# Patient Record
Sex: FEMALE | Race: WHITE | NOT HISPANIC OR LATINO | URBAN - METROPOLITAN AREA
[De-identification: names, ages, dates, MRNs, and addresses within clinical notes are randomized per-mention and may not be internally consistent; named-entity substitution may affect disease eponyms.]

---

## 2019-06-12 ENCOUNTER — EMERGENCY (EMERGENCY)
Facility: HOSPITAL | Age: 34
LOS: 1 days | Discharge: HOME | End: 2019-06-12
Attending: EMERGENCY MEDICINE
Payer: COMMERCIAL

## 2019-06-12 VITALS — HEIGHT: 66 IN | WEIGHT: 199.96 LBS

## 2019-06-12 LAB
ALBUMIN SERPL ELPH-MCNC: 4.8 G/DL — SIGNIFICANT CHANGE UP (ref 3.3–5.2)
ALP SERPL-CCNC: 72 U/L — SIGNIFICANT CHANGE UP (ref 40–120)
ALT FLD-CCNC: 26 U/L — SIGNIFICANT CHANGE UP
ANION GAP SERPL CALC-SCNC: 16 MMOL/L — SIGNIFICANT CHANGE UP (ref 5–17)
APAP SERPL-MCNC: 20.4 UG/ML — SIGNIFICANT CHANGE UP (ref 10–26)
AST SERPL-CCNC: 53 U/L — HIGH
BASOPHILS # BLD AUTO: 0.1 K/UL — SIGNIFICANT CHANGE UP (ref 0–0.2)
BASOPHILS NFR BLD AUTO: 1 % — SIGNIFICANT CHANGE UP (ref 0–2)
BILIRUB SERPL-MCNC: 0.4 MG/DL — SIGNIFICANT CHANGE UP (ref 0.4–2)
BUN SERPL-MCNC: 12 MG/DL — SIGNIFICANT CHANGE UP (ref 8–20)
CALCIUM SERPL-MCNC: 10.2 MG/DL — SIGNIFICANT CHANGE UP (ref 8.6–10.2)
CHLORIDE SERPL-SCNC: 103 MMOL/L — SIGNIFICANT CHANGE UP (ref 98–107)
CO2 SERPL-SCNC: 20 MMOL/L — LOW (ref 22–29)
CREAT SERPL-MCNC: 1 MG/DL — SIGNIFICANT CHANGE UP (ref 0.5–1.3)
EOSINOPHIL # BLD AUTO: 0.8 K/UL — HIGH (ref 0–0.5)
EOSINOPHIL NFR BLD AUTO: 6 % — HIGH (ref 0–5)
ETHANOL SERPL-MCNC: <10 MG/DL — SIGNIFICANT CHANGE UP
GLUCOSE SERPL-MCNC: 95 MG/DL — SIGNIFICANT CHANGE UP (ref 70–115)
HCG SERPL-ACNC: <4 MIU/ML — SIGNIFICANT CHANGE UP
HCT VFR BLD CALC: 35.7 % — LOW (ref 37–47)
HGB BLD-MCNC: 12 G/DL — SIGNIFICANT CHANGE UP (ref 12–16)
LYMPHOCYTES # BLD AUTO: 26 % — SIGNIFICANT CHANGE UP (ref 20–55)
LYMPHOCYTES # BLD AUTO: 3.9 K/UL — SIGNIFICANT CHANGE UP (ref 1–4.8)
MCHC RBC-ENTMCNC: 29.5 PG — SIGNIFICANT CHANGE UP (ref 27–31)
MCHC RBC-ENTMCNC: 33.6 G/DL — SIGNIFICANT CHANGE UP (ref 32–36)
MCV RBC AUTO: 87.7 FL — SIGNIFICANT CHANGE UP (ref 81–99)
MONOCYTES # BLD AUTO: 1.3 K/UL — HIGH (ref 0–0.8)
MONOCYTES NFR BLD AUTO: 11 % — HIGH (ref 3–10)
NEUTROPHILS # BLD AUTO: 8.5 K/UL — HIGH (ref 1.8–8)
NEUTROPHILS NFR BLD AUTO: 56 % — SIGNIFICANT CHANGE UP (ref 37–73)
PLATELET # BLD AUTO: 401 K/UL — HIGH (ref 150–400)
POTASSIUM SERPL-MCNC: 4.9 MMOL/L — SIGNIFICANT CHANGE UP (ref 3.5–5.3)
POTASSIUM SERPL-SCNC: 4.9 MMOL/L — SIGNIFICANT CHANGE UP (ref 3.5–5.3)
PROT SERPL-MCNC: 8.6 G/DL — SIGNIFICANT CHANGE UP (ref 6.6–8.7)
RBC # BLD: 4.07 M/UL — LOW (ref 4.4–5.2)
RBC # FLD: 14.3 % — SIGNIFICANT CHANGE UP (ref 11–15.6)
SALICYLATES SERPL-MCNC: <0.6 MG/DL — LOW (ref 10–20)
SODIUM SERPL-SCNC: 139 MMOL/L — SIGNIFICANT CHANGE UP (ref 135–145)
WBC # BLD: 14.6 K/UL — HIGH (ref 4.8–10.8)
WBC # FLD AUTO: 14.6 K/UL — HIGH (ref 4.8–10.8)

## 2019-06-12 PROCEDURE — 99284 EMERGENCY DEPT VISIT MOD MDM: CPT

## 2019-06-12 PROCEDURE — 93010 ELECTROCARDIOGRAM REPORT: CPT

## 2019-06-12 RX ORDER — SODIUM CHLORIDE 9 MG/ML
1000 INJECTION INTRAMUSCULAR; INTRAVENOUS; SUBCUTANEOUS ONCE
Refills: 0 | Status: COMPLETED | OUTPATIENT
Start: 2019-06-12 | End: 2019-06-12

## 2019-06-12 RX ORDER — HALOPERIDOL DECANOATE 100 MG/ML
5 INJECTION INTRAMUSCULAR ONCE
Refills: 0 | Status: COMPLETED | OUTPATIENT
Start: 2019-06-12 | End: 2019-06-12

## 2019-06-12 RX ADMIN — Medication 2 MILLIGRAM(S): at 14:35

## 2019-06-12 RX ADMIN — HALOPERIDOL DECANOATE 5 MILLIGRAM(S): 100 INJECTION INTRAMUSCULAR at 15:45

## 2019-06-12 RX ADMIN — Medication 2 MILLIGRAM(S): at 14:55

## 2019-06-12 RX ADMIN — Medication 2 MILLIGRAM(S): at 15:45

## 2019-06-12 RX ADMIN — SODIUM CHLORIDE 1000 MILLILITER(S): 9 INJECTION INTRAMUSCULAR; INTRAVENOUS; SUBCUTANEOUS at 14:57

## 2019-06-12 RX ADMIN — SODIUM CHLORIDE 1000 MILLILITER(S): 9 INJECTION INTRAMUSCULAR; INTRAVENOUS; SUBCUTANEOUS at 16:03

## 2019-06-12 NOTE — ED ADULT NURSE NOTE - OBJECTIVE STATEMENT
pt comes to ED awake alert and in manic state, diaphoretic with dilated pupils on exam. even and unlabored resps present. pt comes to Ed from work, admits to drinking large amounts of caffeine which she knows aggravates her anxiety. pt with known mental health issues. pt reports she hasn't been sleeping well the past few nights so she took a half of a bottle of Mucinex  at 3am. denies SI/HI.

## 2019-06-12 NOTE — ED ADULT NURSE NOTE - NSIMPLEMENTINTERV_GEN_ALL_ED
Implemented All Universal Safety Interventions:  [place] to call system. Call bell, personal items and telephone within reach. Instruct patient to call for assistance. Room bathroom lighting operational. Non-slip footwear when patient is off stretcher. Physically safe environment: no spills, clutter or unnecessary equipment. Stretcher in lowest position, wheels locked, appropriate side rails in place.

## 2019-06-12 NOTE — ED PROVIDER NOTE - CLINICAL SUMMARY MEDICAL DECISION MAKING FREE TEXT BOX
labs reviewed. Pt required significant doses of ativan and haldol.  Pt now resting comfortably.  Pt likely with drug toxicity v. primary psychiatric illness. Pt VS improved to normal after sedation. Pt signed out to DR. Olivares pending reassessment when patient wakes up.  Psychiatry called for evaluation.

## 2019-06-12 NOTE — ED PROVIDER NOTE - NS ED ROS FT
No fever/chills, No photophobia/eye pain/changes in vision, No ear pain/sore throat/dysphagia, No chest pain/palpitations, no SOB/cough/wheeze/stridor, No abdominal pain, No N/V/D, no dysuria/frequency/discharge, No neck/back pain, no rash, no changes in neurological status/function.   +dry mouth  +anxious

## 2019-06-12 NOTE — ED PROVIDER NOTE - PHYSICAL EXAMINATION
Constitutional - well-developed; well nourished. diaphoretic. Head - NCAT. Airway patent. Eyes - Pupils dilated B/L. CV - tachy regular. no murmur. no edema. Pulm - CTAB. Abd - soft, nt. no rebound. no guarding. Neuro - A&Ox3. strength 5/5 x4. sensation intact x4. normal gait. Skin - No rash. MSK - normal ROM.   Very anxious. not sitting still.

## 2019-06-12 NOTE — ED ADULT NURSE REASSESSMENT NOTE - NS ED NURSE REASSESS COMMENT FT1
Assumed care of patient at 1930 from ongoing RN , undressed in yellow gown.  Pt sedated and sleeping at this time. 1:1 in place at this time. Pt on CM, NSR noted. Safety maintained. respirations even non labored, lung clear bilaterally.

## 2019-06-12 NOTE — ED BEHAVIORAL HEALTH ASSESSMENT NOTE - SUMMARY
34 yo , employed full time, reported hx of depression anxiety ADHD dx in childhood, no known prior psychiatric hospitalizations , no hx of violence, hx of occasional cannabis reported    Patient reported taking  over half of a bottle of cold medicine last night that contained phenylephrine, acetaminophen, guaifenesin, and Dextromethorphan and 2 high caffein energy drinks this AM, then at work was noted to appear anxious and agitated brought to ED  patient received 3 doses of ativan 2 mg IV as well as haldol 5 mg , was reported to appear to have rapid speech and psychomotor agitation at triage.    per collateral at baseline vu attends works maintains ADLS has never been agitated has never mentioned suicidal ideation and is even tempered.

## 2019-06-12 NOTE — ED BEHAVIORAL HEALTH ASSESSMENT NOTE - HPI (INCLUDE ILLNESS QUALITY, SEVERITY, DURATION, TIMING, CONTEXT, MODIFYING FACTORS, ASSOCIATED SIGNS AND SYMPTOMS)
34 yo , employed full time, reported hx of depression anxiety ADHD dx in childhood, no know prior psychiatric hospitalizations , no hx of violence, hx of occasional cannabis reported    Patient reported taking  over half of a bottle of cold medicine last night that contained phenylephrine, acetaminophen, guafenesin, and Dextromethorphan and 2 high caffein energy drinks this AM, then at work was noted to appear anxious and agitated brought to ED  patient received 3 doses of ativan 2 mg IV as well as haldol 5 mg , was reported to appear to have rapid speech and psychomotor agitation at triage.    Patient evaluation attempted patient sedated cannot be woken by shaking leg and yelling name loudly multiple times      Ex boyfriend at bedside states he heard patient over phone earlier and she was not making any sense. He reports at baseline patient is calm has never been agitated, has never mentioned suicide , he is not aware of her abusing drugs, however has a chronic cough for years which she self medicates by taking large amounts of mucinex, does not take his advice to get it checked out he feels her mucinex use is excessive however does not particularly feel she takes it to get high.  he states patient takes cymbalta and has reported it is helpful, he does not know if she sees a psychiatrist, reports she takes adderall for ADHD which was diagnosed in childhood, vu continues to struggle with time management and organization. he states patient is a hoarder, however slowly has been trying to get rid of things, this is why he broke up with her 1 year ago. he states patient does attend work regularly and maintains her ADLS, he denies any hx of manic or psychotic symptoms, and states she sleeps regularly. he reports patient has verbal arguments with her roomate who is moving out because he cannot tolerate patient's hoarding. He reports patient has a hx of suddenly falling asleep during the day, he suspects she is overworked does not think it is narcolepsy.

## 2019-06-12 NOTE — ED BEHAVIORAL HEALTH ASSESSMENT NOTE - DESCRIPTION
Vital Signs Last 24 Hrs  T(C): 36.6 (12 Jun 2019 19:56), Max: 36.9 (12 Jun 2019 15:27)  T(F): 97.9 (12 Jun 2019 19:56), Max: 98.5 (12 Jun 2019 15:27)  HR: 92 (12 Jun 2019 19:56) (92 - 118)  BP: 116/58 (12 Jun 2019 19:56) (115/62 - 130/58)  BP(mean): --  RR: 19 (12 Jun 2019 19:56) (16 - 19)  SpO2: 100% (12 Jun 2019 19:56) (95% - 100%) lives in house apartment on 2nd floor with a male roomate

## 2019-06-12 NOTE — ED ADULT TRIAGE NOTE - CHIEF COMPLAINT QUOTE
pt comes to ED from work in obvious manic state, flight of ideas with reports of anxiety and dry mouth. pt reports she drank 2 large caffeine containing drinks which she knows makes the symptoms worse. states she took a few doses of Mucinex at 3am today to help her sleep. pupils dilated, increased activity, ER MD at bedside. denies SI.

## 2019-06-12 NOTE — ED PROVIDER NOTE - PROGRESS NOTE DETAILS
Received patient signout from Dr. Olivares.  Patient agitated, aggressive medically cleared pending psych evaluation. Patient cleared by psych will talk to her physician Dr. Paniagua about getting therapy.

## 2019-06-12 NOTE — ED ADULT NURSE REASSESSMENT NOTE - NS ED NURSE REASSESS COMMENT FT1
Pt sleeping in stretcher in no apparent signs of distress and connected to cardiac monitor with 1:1 and family member at bedside, refer to flowsheet and chart, pt safety maintained, pt hemodynamically stable.

## 2019-06-12 NOTE — ED ADULT NURSE REASSESSMENT NOTE - NS ED NURSE REASSESS COMMENT FT1
Pt in manic state, aggressive, uncontrollable, flailing all extremeties, uncooperative. MD Devi at bedside. PIV pulled out during episode, IM Haldol and Ativan administered per MD Devi's orders.

## 2019-06-12 NOTE — ED ADULT NURSE REASSESSMENT NOTE - NS ED NURSE REASSESS COMMENT FT1
pt moved to main ED, report handed off to TIMBO RN. pt remains in manic state, IV site  is patent, subsequent dose of ativan ordered, Dr Michaels at bedside for re-evaluation. even and unlabored resps, remains tachycardic.

## 2019-06-12 NOTE — ED PROVIDER NOTE - OBJECTIVE STATEMENT
Pt is a 32 yo F co anxiety.  PMHx significant for anxiety.  Pt states that she has been under considerable stress recently in her personal life. Pt also states that she has been suffering from seasonal allergies and took over half of a bottle of cold medicine last night that contained phenylephrine, acetaminophen, guafenesin, and Dextromethorphan.  Pt states that this morning she had two large energy drinks with caffeine in them. Pt states that at work she became very anxious and could not sit still. Pt's friend convinced her to call an ambulance and come to the ER.  Pt's friend states that he has never seen her like this in the past.

## 2019-06-13 VITALS
RESPIRATION RATE: 20 BRPM | DIASTOLIC BLOOD PRESSURE: 76 MMHG | TEMPERATURE: 98 F | OXYGEN SATURATION: 100 % | SYSTOLIC BLOOD PRESSURE: 135 MMHG | HEART RATE: 88 BPM

## 2019-06-13 LAB
APPEARANCE UR: CLEAR — SIGNIFICANT CHANGE UP
BILIRUB UR-MCNC: ABNORMAL
COLOR SPEC: ABNORMAL
DIFF PNL FLD: NEGATIVE — SIGNIFICANT CHANGE UP
GLUCOSE UR QL: NEGATIVE MG/DL — SIGNIFICANT CHANGE UP
KETONES UR-MCNC: ABNORMAL
LEUKOCYTE ESTERASE UR-ACNC: ABNORMAL
NITRITE UR-MCNC: NEGATIVE — SIGNIFICANT CHANGE UP
PCP SPEC-MCNC: SIGNIFICANT CHANGE UP
PH UR: 5 — SIGNIFICANT CHANGE UP (ref 5–8)
PROT UR-MCNC: 30 MG/DL
SP GR SPEC: 1.03 — HIGH (ref 1.01–1.02)
UROBILINOGEN FLD QL: 1 MG/DL

## 2019-06-13 PROCEDURE — 96361 HYDRATE IV INFUSION ADD-ON: CPT

## 2019-06-13 PROCEDURE — 80053 COMPREHEN METABOLIC PANEL: CPT

## 2019-06-13 PROCEDURE — 84436 ASSAY OF TOTAL THYROXINE: CPT

## 2019-06-13 PROCEDURE — 84443 ASSAY THYROID STIM HORMONE: CPT

## 2019-06-13 PROCEDURE — 96372 THER/PROPH/DIAG INJ SC/IM: CPT | Mod: XU

## 2019-06-13 PROCEDURE — 96374 THER/PROPH/DIAG INJ IV PUSH: CPT

## 2019-06-13 PROCEDURE — 80307 DRUG TEST PRSMV CHEM ANLYZR: CPT

## 2019-06-13 PROCEDURE — 84702 CHORIONIC GONADOTROPIN TEST: CPT

## 2019-06-13 PROCEDURE — 99285 EMERGENCY DEPT VISIT HI MDM: CPT | Mod: 25

## 2019-06-13 PROCEDURE — 96376 TX/PRO/DX INJ SAME DRUG ADON: CPT

## 2019-06-13 PROCEDURE — 93005 ELECTROCARDIOGRAM TRACING: CPT

## 2019-06-13 PROCEDURE — 85027 COMPLETE CBC AUTOMATED: CPT

## 2019-06-13 PROCEDURE — 81001 URINALYSIS AUTO W/SCOPE: CPT

## 2019-06-13 PROCEDURE — 36415 COLL VENOUS BLD VENIPUNCTURE: CPT

## 2019-06-13 NOTE — ED BEHAVIORAL HEALTH NOTE - BEHAVIORAL HEALTH NOTE
Please review Dr Nix notes dated 6/13/19. The patient relates she works full time at a place called [employer]" and worked as recently as yesterday. She explained for the past 4 days she has not been sleeping and was having allergy problems. She took several teaspoons of cough medicine and had no idea it would effect her as it did. She is emphatic this was not a suicide attempt. She has never been psychiatrically hospitalized and has never had SI/HI, plan intent or impulses, past or present. Currently denies AH/VH. Yousuf SI/HI, plan, intent or impulses. Thoughts are goal directed. Delusions not elicited. Average fund of knowledge. No apparent language abnormalities. Insight  and judgment is adequate at this time. Positive for some short term memory gaps. Long term memory intact.  Patient smokes marijuana when she has any sleep disturbance.   Patient lives alone but has friends and her ex boyfriend will pick her up. Her parents who are  live in other states. She does not get along with her mother. Her sister lives out of state as well.  Patient denies any drug use other than marijuana.    Formulation:33 year old SF brought to the ER yesterday and after being medicated for agitation could not be interviewed until today. Patient reported not sleeping in days. She denies any history of inpatient hospitalizations and emphatically denied this was a suicide attempt. She has been going to [facility] in [place] and is seen by Dr Paniagua ([phone] who gives her Adderal XR 60mg in am and 60mg po in pm. There are no legal issues. Patient is awake , alert, articulate and mental status as noted above.    Axis 1 ADHD            Marijuana Use D/O            Impulse Control Disorder            R/O Bipolar D/O, Type 1    Disposition: Conferenced with Dr Santana  and Dr. Paniagua. Patient is to be discharged with follow up with Dr. Arcelia priest. Patient explained she took Adderall XR 60mg often at 5pm. Instructed to take no later than 2 pm and to review Adderall dose with her MD. Recommend she see a therapist on a weekly basis.  Patient may return to the ER at any time for any s/s of depression, anxiety.      Radhika Garcia NP in Psychiatry

## 2019-06-13 NOTE — ED ADULT NURSE REASSESSMENT NOTE - NS ED NURSE REASSESS COMMENT FT1
Assumed care of patient at 0720.  Patient resting in bed with no distress.  Awakens on verbal interaction.  Patient complaint of feeling tired and cold, blankets provided.  Patient oriented to staff and educated about plan of care.  No attempts to harm self or others.  Fall precautions in place and safety maintained.

## 2019-06-13 NOTE — ED ADULT NURSE REASSESSMENT NOTE - NS ED NURSE REASSESS COMMENT FT1
Patient remains drowsy when awake.  Snacks provided as requested.  No distress noted.  Patient awaking completion of consult. Patient remains drowsy when awake.  Snacks provided as requested.  No distress noted.  Patient awaiting completion of consult.

## 2019-06-13 NOTE — ED ADULT NURSE REASSESSMENT NOTE - NS ED NURSE REASSESS COMMENT FT1
Pt medically cleared by MD Olivares. pt drowsy from sedation, ambulating unsteadily . Pt cooperative with security check, reports she had a panic attack in the ED and has not slept well for the past x3 nights. Pt denies any recent ETOH use. pt mumbling, and falling asleep during the assessment. Pt requesting water and cookies. Comfort measures provided to pt. urine sample pending. Will monitor the pt for safety.

## 2019-06-13 NOTE — ED ADULT NURSE REASSESSMENT NOTE - COMFORT CARE
ambulated to bathroom
po fluids offered/warm blanket provided/wait time explained/meal provided/plan of care explained/repositioned/darkened lights
plan of care explained

## 2019-06-13 NOTE — ED ADULT NURSE REASSESSMENT NOTE - GENERAL PATIENT STATE
comfortable appearance/cooperative/drowsy/resting/sleeping
comfortable appearance/cooperative
comfortable appearance/drowsy/resting/sleeping

## 2019-06-13 NOTE — ED ADULT NURSE REASSESSMENT NOTE - NS ED NURSE REASSESS COMMENT FT1
Patient ambulated to bathroom with a slow steady gait but noted to be drowsy.  Fall precautions maintained with no falls.

## 2021-10-28 NOTE — ED BEHAVIORAL HEALTH NOTE - BEHAVIORAL HEALTH NOTE
Astigmatism correction was discussed. unable to maintain alertness for interview  called Dr Paniagua @ [phone] at [facility] and left message Informed he comes in at 2 PM  PHP I-Stop indicates Rx for Adderall 30 mg BID